# Patient Record
Sex: FEMALE | NOT HISPANIC OR LATINO | URBAN - METROPOLITAN AREA
[De-identification: names, ages, dates, MRNs, and addresses within clinical notes are randomized per-mention and may not be internally consistent; named-entity substitution may affect disease eponyms.]

---

## 2017-07-31 ENCOUNTER — CONVERSION ENCOUNTER (OUTPATIENT)
Dept: OTHER | Facility: HOSPITAL | Age: 72
End: 2017-07-31

## 2018-02-01 ENCOUNTER — OFFICE VISIT CONVERTED (OUTPATIENT)
Dept: FAMILY MEDICINE CLINIC | Age: 73
End: 2018-02-01
Attending: FAMILY MEDICINE

## 2018-03-29 ENCOUNTER — OFFICE VISIT CONVERTED (OUTPATIENT)
Dept: FAMILY MEDICINE CLINIC | Age: 73
End: 2018-03-29
Attending: FAMILY MEDICINE

## 2018-04-24 ENCOUNTER — OFFICE VISIT CONVERTED (OUTPATIENT)
Dept: FAMILY MEDICINE CLINIC | Age: 73
End: 2018-04-24
Attending: FAMILY MEDICINE

## 2021-05-18 NOTE — PROGRESS NOTES
Mirlande Bowie 1945     Office/Outpatient Visit    Visit Date: Thu, Mar 29, 2018 09:55 am    Provider: Sumeet Purdy MD (Assistant: Cheryl Corcoran MA)    Location: [facility]        Electronically signed by Sumeet Purdy MD on  03/29/2018 05:46:09 PM                             SUBJECTIVE:        CC: headache     Ms. Bowie is a 72 year old White female.  follow up from hospital visit last night;         HPI:     Heaven is in today for follow up on how she has been feeling.  She says that she had headache and some neck pain yesterday that bothered her.  She actually went to the ER last night and was treated and released.  She did have labs and CT that were apparently normal.  She is unsure what caused these symptoms.  She is under unusual stress in that she is moving in the next few weeks.  She also has to care for her  regularly.  At any rate, she had a numb feeling on the L side of her face.  She has had this previously.  She did have carotid ultrasound last year that was normal.          Heaven is also in for follow up on chronic pain related to degenerative disk disease in the lumbar spine.  She takes hydrocodone regularly for this and has been on it for a long period of time.  Her pain has been fairly stable since her most recent visit.  She denies abuse, diversion, or doctor shopping.  Leonid is okay.         She also has chronic anxiety for which she takes lorazepam.  She does take this mostly at night to help her rest and sees benefit from it.   She is without acute concerns.  She has not had obvious side effects from the medication.  As noted above, her stressors have been fairly significant.     ROS:     CONSTITUTIONAL:  Negative for chills and fever.      CARDIOVASCULAR:  Negative for chest pain and palpitations.      RESPIRATORY:  Negative for recent cough and dyspnea.      GASTROINTESTINAL:  Negative for abdominal pain, nausea and vomiting.          PMH/FMH/SH:     Last  Reviewed on 3/29/2018 10:21 AM by Sumeet Purdy    Past Medical History:         Hyperparathyroidism     Depression: treated with Lexapro in the past;     Suicidal ideation in past     Macular Degeneration    Retinal Histoplasmosis     Systemic Lupus Erythematosus         PREVENTIVE HEALTH MAINTENANCE             COLORECTAL CANCER SCREENING: Up to date (colonoscopy q10y; sigmoidoscopy q5y; Cologuard q3y) was last done 2011, Results are in chart; colonoscopy with normal results     MAMMOGRAM: Done within last 2 years and results in are chart was last done 2017 with normal results         Surgical History:         Cholecystectomy    Hysterectomy Procedures: colonoscopy          Family History:     Father: Medical history unknown     Mother:  at age 89; Cause of death was pneumonia;  Myocardial Infarction;  Alzheimer's Disease     Brother(s): Myocardial Infarction ( 47 )         Social History:     Occupation: (Self-Employed)      Marital Status:      Children: 2 children         Tobacco/Alcohol/Supplements:     Last Reviewed on 3/29/2018 10:21 AM by Sumeet Purdy    Tobacco: She has a past history of cigarette smoking; quit date:  .  Non-drinker         Substance Abuse History:     Last Reviewed on 3/29/2018 10:21 AM by Sumeet Purdy        Mental Health History:     Last Reviewed on 3/29/2018 10:21 AM by Sumeet Purdy        Communicable Diseases (eg STDs):     Last Reviewed on 3/29/2018 10:21 AM by Sumeet Purdy            Current Problems:     Last Reviewed on 3/29/2018 10:21 AM by Sumeet Purdy    Primary hyperparathyroidism     Dizziness     Migraine without aura     Use of high risk medications     Vitamin D deficiency     Hypertension     Anxiety     Hypercalcemia     Low back pain     Headache     Upper back pain         Immunizations:     zzPrevnar-13 2015     Fluzone (3 + years dose) 2011     Fluzone (3 +  years dose) 10/13/2012     Fluzone High-Dose pf (>=65 yr) 10/14/2013     Fluzone High-Dose pf (>=65 yr) 10/13/2014     Fluzone High-Dose pf (>=65 yr) 9/16/2015     Fluzone High-Dose pf (>=65 yr) 9/14/2016     PNEUMOVAX 23 (Pneumococcal PPV23) 1/15/2013     Adacel (Tdap) 1/15/2013         Allergies:     Last Reviewed on 3/29/2018 10:21 AM by Sumeet Purdy    Cipro:    Lexapro: Suicidal ideation        Current Medications:     Last Reviewed on 3/29/2018 10:21 AM by Sumeet Purdy    Norco 5mg/325mg Tablet Take 1 tablet(s) by mouth q8h prn     Amlodipine  2.5mg Tablet Take 1 tablet(s) by mouth daily     Ativan 0.5mg Tablet Take 1 tablet(s) by mouth bid prn     Corgard 40mg Tablet Take 1 tablet(s) by mouth daily     Trazodone HCl 50mg Tablet Take 1 tablet(s) by mouth at bedtime     Sumatriptan 50mg Tablet 1 tab(s) po prn  for migraine, may repeat dose every 2 hr, max 200mg/day     Aspirin (ASA) 81mg Tablets, Enteric Coated 1 tab daily         OBJECTIVE:        Vitals:         Current: 3/29/2018 10:00:09 AM    Ht:  5 ft, 4 in;  Wt: 133.8 lbs;  BMI: 23.0    T: 97.6 F (oral);  BP: 135/87 mm Hg (left arm, sitting);  P: 60 bpm (left arm (BP Cuff), sitting);  sCr: 1.03 mg/dL;  GFR: 48.30        Exams:     PHYSICAL EXAM:     GENERAL: vital signs recorded - well developed, well nourished;  no apparent distress;     EYES: extraocular movements intact; conjunctiva and cornea are normal; PERRLA;     E/N/T:  normal EACs, TMs, nasal/oral mucosa, teeth, gingiva, and oropharynx;     NECK: range of motion is normal; thyroid is non-palpable;     RESPIRATORY: normal respiratory rate and pattern with no distress; normal breath sounds with no rales, rhonchi, wheezes or rubs;     CARDIOVASCULAR: normal rate; rhythm is regular;  no systolic murmur; no edema;     GASTROINTESTINAL: nontender; normal bowel sounds; no organomegaly;     LYMPHATIC: no enlargement of cervical or facial nodes; no supraclavicular nodes;      BREAST/INTEGUMENT: SKIN: no significant rashes or lesions; no suspicious moles;     NEUROLOGIC: mental status: alert and oriented x 3; cranial nerves II-XII grossly intact; no focal weakness is apparent on exam;     PSYCHIATRIC: affect/demeanor: anxious;         ASSESSMENT           784.0   R51  Headache              DDx:     V58.69   Z79.899  Use of high risk medications              DDx:     300.02   F41.3  Anxiety              DDx:     724.2   M54.5  Low back pain              DDx:     782.0   R20.2  Paresthesia              DDx:     790.6   R73.01  Hyperglycemia              DDx:         ORDERS:         Lab Orders:       [id number]  A1CEG - [facility] Hemoglobin A1C  (Send-Out)         92034  B12FO - [facility] Vitamin B12 with Folate  (Send-Out)         44840  MG - [facility] Magnesium, Serum  (Send-Out)         88827  SED - [facility] Sedimentation rate, non-automated ESR  (Send-Out)                   PLAN:          Headache     LABORATORY:  Labs ordered to be performed today include ESR.      RECOMMENDATIONS given include: We have reviewed Heaven's care.  My feeling is that some of what she has been experiencing this week may be related to anxiety.  However, I want to be careful in this.  We will await the lab results from the hospital.  The CT and CXR are reassuring.  I may consider some additional labs to rule out PMR, but she is not in significant pain today.  With regard to her pain and anxiety, I am not planning to make changes in her medications presently.  They will be refilled when needed.  If these symptoms persist, and we think it is related to anxiety, consider a more regular medication for this..  She was on Lexapro previously and says that she developed suicidal thoughts while on it.         ADDENDUM - I've reviewed the testing from [facility].  Please see if their lab can add the labs in this visit.  If not, then I would recommend Heaven stop in tomorrow or next week for the labs noted here.  Thanks. - Sumeet Purdy MD - 3/29/18 - 12:37  3/29/18 order faxed to [facility]           Orders:       [id number]  SED - [facility] Sedimentation rate, non-automated ESR  (Send-Out)             Patient Education Handouts:       [facility] [place]           Use of high risk medications As above.          Anxiety As above.          Low back pain As above.          Paresthesia     LABORATORY:  Labs ordered to be performed today include B12 with Folate and Magnesium level.            Orders:       08353  B12FO - [facility] Vitamin B12 with Folate  (Send-Out)         43221  MG - [facility] Magnesium, Serum  (Send-Out)            Hyperglycemia     LABORATORY:  Labs ordered to be performed today include HgbA1C.            Orders:       03488  A1CEG - [facility] Hemoglobin A1C  (Send-Out)               CHARGE CAPTURE           **Please note: ICD descriptions below are intended for billing purposes only and may not represent clinical diagnoses**        Primary Diagnosis:         784.0 Headache            R51    Headache              Orders:          [id number]   Office/outpatient visit; established patient, level 4  (In-House)           V58.69 Use of high risk medications            Z79.899    Other long term (current) drug therapy    300.02 Anxiety            F41.3    Other mixed anxiety disorders    724.2 Low back pain            M54.5    Low back pain    782.0 Paresthesia            R20.2    Paresthesia of skin    790.6 Hyperglycemia            R73.01    Impaired fasting glucose        ADDENDUMS:      ____________________________________    Addendum: 03/31/2018 08:45 AM - Sumeet Purdy        Please let Heaven know that the labs we added to what was done in the ER look okay.  However, her B12 level is borderline.  I have sent a supplement for this to her pharmacy.  I'd like her to monitor her symptoms for the time being.  We may consider additional evaluation depending on how things progress.  Let me know if she has particular concerns.  Thanks.        Addendum: 04/02/2018 01:41 PM - Four,  Team        pt inf/th

## 2021-05-18 NOTE — PROGRESS NOTES
Mirlande Bowie 1945     Office/Outpatient Visit    Visit Date: Thu, Feb 1, 2018 10:59 am    Provider: Sumeet Purdy MD (Assistant: Hawa Coughlin MA)    Location: [facility]        Electronically signed by Sumeet Purdy MD on  02/01/2018 01:19:13 PM                             SUBJECTIVE:        CC: medication follow up, pain, anxiety, blood pressure     Ms. Bowie is a 72 year old White female.  (PT not taking buspirone);         HPI:     Heaven is in today for follow up on chronic pain related to degenerative disk disease in the lumbar spine.  She takes hydrocodone regularly for this and has been on it for a long period of time.  She says that her pain has gotten worse in the last couple of months.  She says that it is worse with activity.  She does not feel that we could make a change in the medications.  She would like to move ahead with MRI possibly.  The pain is primarily located in the mid to upper back at this time.  She denies abuse, diversion, or doctor shopping.  Leonid is okay.         She also has chronic anxiety for which she has been on lorazepam chronically.  She does take this mostly at night to help her rest and sees benefit from it.   She is without acute concerns.  She has not had obvious side effects from the medication.  She helps care for her  and continues to have significant stress related to this.  She does not feel that we can cut back on this presently.  Risks of these medications are discussed.         Dx with hypertension; her current cardiac medication regimen includes a beta-blocker ( Corgard ) and a calcium channel blocker ( Norvasc ).  She is tolerating the medication well without side effects.  Compliance with treatment has been good; she takes her medication as directed.          She also has primary hyperparathyroidism and is having periodic blood work.      ROS:     CONSTITUTIONAL:  Negative for chills and fever.      CARDIOVASCULAR:  Negative  for chest pain and palpitations.      RESPIRATORY:  Negative for recent cough and dyspnea.      GASTROINTESTINAL:  Negative for abdominal pain, nausea and vomiting.      INTEGUMENTARY/BREAST:  Negative for atypical mole(s) and rash.          PMH/FMH/SH:     Last Reviewed on 2018 11:36 AM by Sumeet Purdy    Past Medical History:         Hyperparathyroidism     Depression: treated with Lexapro in the past;     Suicidal ideation in past     Macular Degeneration    Retinal Histoplasmosis     Systemic Lupus Erythematosus         PREVENTIVE HEALTH MAINTENANCE             COLORECTAL CANCER SCREENING: Up to date (colonoscopy q10y; sigmoidoscopy q5y; Cologuard q3y) was last done 2011, Results are in chart; colonoscopy with normal results     MAMMOGRAM: Done within last 2 years and results in are chart was last done 2017 with normal results         Surgical History:         Cholecystectomy    Hysterectomy Procedures: colonoscopy          Family History:     Father: Medical history unknown     Mother:  at age 89; Cause of death was pneumonia;  Myocardial Infarction;  Alzheimer's Disease     Brother(s): Myocardial Infarction ( 47 )         Social History:     Occupation: (Self-Employed)      Marital Status:      Children: 2 children         Tobacco/Alcohol/Supplements:     Last Reviewed on 2018 11:36 AM by Sumeet Purdy    Tobacco: She has a past history of cigarette smoking; quit date:  .  Non-drinker         Substance Abuse History:     Last Reviewed on 2018 11:36 AM by Sumeet Purdy        Mental Health History:     Last Reviewed on 2018 11:36 AM by Sumeet Purdy        Communicable Diseases (eg STDs):     Last Reviewed on 2018 11:36 AM by Sumeet Purdy            Current Problems:     Last Reviewed on 2018 11:36 AM by Sumeet Purdy    Primary hyperparathyroidism     Dizziness     Migraine without aura      Use of high risk medications     Vitamin D deficiency     Hypertension     Anxiety     Hypercalcemia     Low back pain         Immunizations:     Prevnar-13 12/29/2015     Fluzone (3 + years dose) 11/8/2011     Fluzone (3 + years dose) 10/13/2012     Fluzone High-Dose pf (>=65 yr) 10/14/2013     Fluzone High-Dose pf (>=65 yr) 10/13/2014     Fluzone High-Dose pf (>=65 yr) 9/16/2015     Fluzone High-Dose pf (>=65 yr) 9/14/2016     PNEUMOVAX 23 (Pneumococcal PPV23) 1/15/2013     Adacel (Tdap) 1/15/2013         Allergies:     Last Reviewed on 2/01/2018 11:36 AM by Sumeet Purdy    Cipro:        Current Medications:     Last Reviewed on 2/01/2018 11:36 AM by Sumeet Purdy    Corgard 40mg Tablet Take 1 tablet(s) by mouth daily     Norco 5mg/325mg Tablet Take 1 tablet(s) by mouth q8h prn     Amlodipine  2.5mg Tablet Take 1 tablet(s) by mouth daily     Ativan 0.5mg Tablet Take 1 tablet(s) by mouth bid prn     Trazodone HCl 50mg Tablet Take 1 tablet(s) by mouth at bedtime     Sumatriptan 50mg Tablet 1 tab(s) po prn  for migraine, may repeat dose every 2 hr, max 200mg/day     Aspirin (ASA) 81mg Tablets, Enteric Coated 1 tab daily         OBJECTIVE:        Vitals:         Current: 2/1/2018 11:03:34 AM    Ht:  5 ft, 4 in;  Wt: 139.5 lbs;  BMI: 23.9    T: 98.5 F (oral);  BP: 137/89 mm Hg (left arm, sitting);  P: 66 bpm (left arm (BP Cuff), sitting);  sCr: 1.03 mg/dL;  GFR: 49.17        Exams:     PHYSICAL EXAM:     GENERAL: vital signs recorded - well developed, well nourished;  no apparent distress;     EYES: extraocular movements intact; conjunctiva and cornea are normal; PERRLA;     E/N/T:  normal EACs, TMs, nasal/oral mucosa, teeth, gingiva, and oropharynx;     NECK: range of motion is normal; thyroid is non-palpable;     RESPIRATORY: normal respiratory rate and pattern with no distress; normal breath sounds with no rales, rhonchi, wheezes or rubs;     CARDIOVASCULAR: normal rate; rhythm is regular;  no  systolic murmur; no edema;     GASTROINTESTINAL: nontender; normal bowel sounds; no organomegaly;     BREAST/INTEGUMENT: SKIN: no significant rashes or lesions; no suspicious moles;     MUSCULOSKELETAL: there is no focal tenderness in the lower back - some discomfort in palpation in the mid back though - no other acute finding;     NEUROLOGIC: no obvious weakness in the lower legs;         ASSESSMENT           V58.69   Z79.899  Use of high risk medications              DDx:     300.02   F41.3  Anxiety              DDx:     724.2   M54.5  Low back pain              DDx:     401.1   I10  Hypertension              DDx:     252.01   E21.0  Primary hyperparathyroidism              DDx:     724.1   M54.6  Upper back pain              DDx:         ORDERS:         Meds Prescribed:       Refill of: Corgard (Nadolol) 40mg Tablet Take 1 tablet(s) by mouth daily  #90 (Ninety) tablet(s) Refills: 3       Refill of: Norco (Hydrocodone/Acetaminophen) 5mg/325mg Tablet Take 1 tablet(s) by mouth q8h prn  #90 (Ninety) tablet(s) Refills: 0       Refill of: Amlodipine  2.5mg Tablet Take 1 tablet(s) by mouth daily  #90 (Ninety) tablet(s) Refills: 3       Refill of: Ativan (Lorazepam) 0.5mg Tablet Take 1 tablet(s) by mouth bid prn  #60 (Sixty) tablet(s) Refills: 2       Refill of: Trazodone HCl 50mg Tablet Take 1 tablet(s) by mouth at bedtime  #90 (Ninety) tablet(s) Refills: 3       Hydrocortisone/Neomycin/Polymyxin B  1%/0.35%/10,000U Otic Suspension Instill 4 drop(s) in affected ear(s) tid  #10 (Ten) ml Refills: 0         Radiology/Test Orders:       [id number]  Radiologic examination, spine; thoracic, three views  (Send-Out)           Lab Orders:       [id number]  COMP - [facility] Comp. Metabolic Panel  (Send-Out)         [id number]  PTHIN - [facility] PTH, intact  (Send-Out)         27227  VITD - [facility] Vitamin D, 25 Hydroxy  (Send-Out)                   PLAN:          Use of high risk medications         RECOMMENDATIONS given include: Today, we have reviewed  Mirlande's care.  She has been doing well, but she is seeing some increased pain in the mid and upper back.  We will go ahead and get X-rays as a caution on this.  I don't think MRI needed yet.  We will refill her medications as noted including hydrocodone and lorazepam.  Risks are discussed as noted, but they are helpful medications for her, and she is not having signs of adverse effects from them.  We will monitor closely.  Labs to be arranged in about 30 days..            Patient Education Handouts:       Controlled Prescription Drug education           Anxiety As above.           Prescriptions:       Refill of: Ativan (Lorazepam) 0.5mg Tablet Take 1 tablet(s) by mouth bid prn  #60 (Sixty) tablet(s) Refills: 2       Refill of: Trazodone HCl 50mg Tablet Take 1 tablet(s) by mouth at bedtime  #90 (Ninety) tablet(s) Refills: 3          Low back pain As above.           Prescriptions:       Refill of: Norco (Hydrocodone/Acetaminophen) 5mg/325mg Tablet Take 1 tablet(s) by mouth q8h prn  #90 (Ninety) tablet(s) Refills: 0          Hypertension As above.     LABORATORY:  Labs ordered to be performed today include Comprehensive metabolic panel.            Prescriptions:       Refill of: Corgard (Nadolol) 40mg Tablet Take 1 tablet(s) by mouth daily  #90 (Ninety) tablet(s) Refills: 3       Refill of: Amlodipine  2.5mg Tablet Take 1 tablet(s) by mouth daily  #90 (Ninety) tablet(s) Refills: 3           Orders:       [id number]  COMP - [facility] Comp. Metabolic Panel  (Send-Out)            Primary hyperparathyroidism As above.     LABORATORY:  Labs ordered to be performed today include PTH intact and Vitamin D.            Orders:       [id number]  PTHIN - [facility] PTH, intact  (Send-Out)         63846  VITD - [facility] Vitamin D, 25 Hydroxy  (Send-Out)            Upper back pain         RADIOLOGY:  I have ordered Thoracic Spine to be done today.            Orders:       [id number]  Radiologic examination, spine; thoracic, three views  (Send-Out)                Other Prescriptions:       Hydrocortisone/Neomycin/Polymyxin B  1%/0.35%/10,000U Otic Suspension Instill 4 drop(s) in affected ear(s) tid  #10 (Ten) ml Refills: 0         CHARGE CAPTURE           **Please note: ICD descriptions below are intended for billing purposes only and may not represent clinical diagnoses**        Primary Diagnosis:         V58.69 Use of high risk medications            Z79.899    Other long term (current) drug therapy              Orders:          [id number]   Office/outpatient visit; established patient, level 4  (In-House)           300.02 Anxiety            F41.3    Other mixed anxiety disorders    724.2 Low back pain            M54.5    Low back pain    401.1 Hypertension            I10    Essential (primary) hypertension    252.01 Primary hyperparathyroidism            E21.0    Primary hyperparathyroidism    724.1 Upper back pain            M54.6    Pain in thoracic spine        ADDENDUMS:      ____________________________________    Addendum: 02/01/2018 11:57 AM -          Radiology Orders Faxed to:

## 2021-05-18 NOTE — PROGRESS NOTES
Mirlande BowiePenelope 1945     Office/Outpatient Visit    Visit Date: Tue, Apr 24, 2018 10:07 am    Provider: Sumeet Purdy MD (Assistant: Cynthia Sewell MA)    Location: [facility]        Electronically signed by Sumeet Purdy MD on  04/24/2018 05:27:43 PM                             SUBJECTIVE:        CC: pain, anxiety         HPI:     Heaven is in today for follow up on chronic pain related to degenerative disk disease in the lumbar spine.  She was just seen last month, but she is preparing to move and wanted to be seen to cover things for the next 90 days.  Heaven takes hydrocodone regularly for her back and has been on it for a long period of time.  Her pain has been fairly stable since her most recent visit.  She denies abuse, diversion, or doctor shopping.  Leonid is okay.  She is due for drug screen.         She also has chronic anxiety for which she takes lorazepam.  She takes this mostly at night to help her rest and sees benefit from it.   She is without acute concerns.  She has not had obvious side effects from the medication.  As noted, she will be moving, and her stressors are about the same.  I do not see an opportunity for dose reduction.     ROS:     CONSTITUTIONAL:  Negative for chills and fever.      CARDIOVASCULAR:  Negative for chest pain and palpitations.      RESPIRATORY:  Negative for recent cough and dyspnea.      GASTROINTESTINAL:  Negative for abdominal pain, nausea and vomiting.          PMH/FMH/SH:     Last Reviewed on 4/24/2018 10:22 AM by Sumeet Purdy    Past Medical History:         Hyperparathyroidism     Depression: treated with Lexapro in the past;     Suicidal ideation in past     Macular Degeneration    Retinal Histoplasmosis     Systemic Lupus Erythematosus         PREVENTIVE HEALTH MAINTENANCE             COLORECTAL CANCER SCREENING: Up to date (colonoscopy q10y; sigmoidoscopy q5y; Cologuard q3y) was last done 05/2011, Results are in chart; colonoscopy  with normal results     MAMMOGRAM: Done within last 2 years and results in are chart was last done 2017 with normal results         Surgical History:         Cholecystectomy    Hysterectomy Procedures: colonoscopy          Family History:     Father: Medical history unknown     Mother:  at age 89; Cause of death was pneumonia;  Myocardial Infarction;  Alzheimer's Disease     Brother(s): Myocardial Infarction ( 47 )         Social History:     Occupation: (Self-Employed)      Marital Status:      Children: 2 children         Tobacco/Alcohol/Supplements:     Last Reviewed on 2018 10:22 AM by Sumeet Purdy    Tobacco: She has a past history of cigarette smoking; quit date:  .  Non-drinker         Substance Abuse History:     Last Reviewed on 2018 10:22 AM by Sumeet Purdy        Mental Health History:     Last Reviewed on 2018 10:22 AM by Sumeet Purdy        Communicable Diseases (eg STDs):     Last Reviewed on 2018 10:22 AM by Sumeet Purdy            Current Problems:     Last Reviewed on 2018 10:22 AM by Sumeet Purdy    Primary hyperparathyroidism     Dizziness     Migraine without aura     Use of high risk medications     Vitamin D deficiency     Hypertension     Anxiety     Hypercalcemia     Low back pain     Hyperglycemia     Paresthesia     Headache     Upper back pain         Immunizations:     zzPrevnar-13 2015     Fluzone (3 + years dose) 2011     Fluzone (3 + years dose) 10/13/2012     Fluzone High-Dose pf (>=65 yr) 10/14/2013     Fluzone High-Dose pf (>=65 yr) 10/13/2014     Fluzone High-Dose pf (>=65 yr) 2015     Fluzone High-Dose pf (>=65 yr) 2016     PNEUMOVAX 23 (Pneumococcal PPV23) 1/15/2013     Adacel (Tdap) 1/15/2013         Allergies:     Last Reviewed on 2018 10:22 AM by Sumeet Purdy    Lexapro: Suicidal ideation    Cipro:        Current Medications:      Last Reviewed on 4/24/2018 10:22 AM by Sumeet Purdy 5mg/325mg Tablet Take 1 tablet(s) by mouth q8h prn     Vitamin B12 250mcg Tablet Take 1 tablet(s) by mouth daily     Amlodipine  2.5mg Tablet Take 1 tablet(s) by mouth daily     Ativan 0.5mg Tablet Take 1 tablet(s) by mouth bid prn     Corgard 40mg Tablet Take 1 tablet(s) by mouth daily     Trazodone HCl 50mg Tablet Take 1 tablet(s) by mouth at bedtime     Sumatriptan 50mg Tablet 1 tab(s) po prn  for migraine, may repeat dose every 2 hr, max 200mg/day     Aspirin (ASA) 81mg Tablets, Enteric Coated 1 tab daily     Vitamin D3         OBJECTIVE:        Vitals:         Current: 4/24/2018 10:10:24 AM    Ht:  5 ft, 4 in;  Wt: 134.8 lbs;  BMI: 23.1    T: 98.1 F (oral);  BP: 131/87 mm Hg (right arm, sitting);  P: 64 bpm (right arm (BP Cuff), sitting);  sCr: 1.03 mg/dL;  GFR: 48.46        Exams:     PHYSICAL EXAM:     GENERAL: vital signs recorded - well developed, well nourished;  no apparent distress;     NECK: range of motion is normal; thyroid is non-palpable;     RESPIRATORY: normal respiratory rate and pattern with no distress; normal breath sounds with no rales, rhonchi, wheezes or rubs;     CARDIOVASCULAR: normal rate; rhythm is regular;  no systolic murmur; no edema;     GASTROINTESTINAL: nontender; normal bowel sounds; no organomegaly;     BREAST/INTEGUMENT: SKIN: no significant rashes or lesions; no suspicious moles;     MUSCULOSKELETAL: there is no concerning finding in the lower back;     PSYCHIATRIC: appropriate affect and demeanor; normal psychomotor function;         Lab/Test Results:             Amphetamines Screen, Urin:  Negative (04/24/2018),     BAR-Barbiturates Screen, Urin:  Negative (04/24/2018),     Buprenorphine:  Negative (04/24/2018),     BZO-Benzodiazepines Screen,Ur:  Positive (04/24/2018),     Cocaine(Metab.)Screen, Ur:  Negative (04/24/2018),     MDMA-Ecstasy:  Negative (04/24/2018),     Met-Methamphetamine:  Negative  (04/24/2018),     MTD-Methadone Screen, Urine:  Negative (04/24/2018),     Opiate Screen, Urine:  Positive (04/24/2018),     OXY-Oxycodone:  Positive (04/24/2018),     PCP-Phencyclidine Screen, Uri:  Negative (04/24/2018),     THC Cannabinoids Screen, Urin:  Negative (04/24/2018),     Urine temperature:  confirmed (04/24/2018),     Date and time of last pill:  Ativan 4/23/18@10pm [place] 4/24/18@7am (04/24/2018),     Performed by:  tls (04/24/2018),     Collection Time:  10:30 (04/24/2018),             ASSESSMENT           V58.69   Z79.899  Use of high risk medications              DDx:     300.02   F41.3  Anxiety              DDx:     724.2   M54.5  Low back pain              DDx:         ORDERS:         Lab Orders:       [id number]  Drug test prsmv qual dir optical obs per day  (In-House)         [id number]  [facility] [id number] OPIATES  (Send-Out)                   PLAN:          Use of high risk medications     LABORATORY:  Labs ordered to be performed today include Drug screen and Drug Screen Urine [facility] Confirmation OPIATES.      RECOMMENDATIONS given include: Today, we have reviewed Heaven's care.  She is doing well at this time.  I'm going to continue her current medications, and she will contact us when she needs refills.  She is moving to [facility].  We will refills these for her there when they are due.  I did ask her to consider seeing a physician there at some point.  There have not been concerns about abuse or diversion with Heaven.  We will follow closely..            Orders:       [id number]  Drug test prsmv qual dir optical obs per day  (In-House)         [id number]  [facility] - [facility] [id number] OPIATES  (Send-Out)             Patient Education Handouts:       [facility] Medication Compliance           Anxiety As above.             CHARGE CAPTURE           **Please note: ICD descriptions below are intended for billing purposes only and may not represent clinical diagnoses**        Primary Diagnosis:         V58.69 Use of high risk  medications            Z79.899    Other long term (current) drug therapy              Orders:          35697   Office/outpatient visit; established patient, level 3  (In-House)             99079   Drug test prsmv qual dir optical obs per day  (In-House)           300.02 Anxiety            F41.3    Other mixed anxiety disorders    724.2 Low back pain            M54.5    Low back pain

## 2021-07-01 VITALS
BODY MASS INDEX: 23.01 KG/M2 | HEART RATE: 64 BPM | HEIGHT: 64 IN | SYSTOLIC BLOOD PRESSURE: 131 MMHG | TEMPERATURE: 98.1 F | WEIGHT: 134.8 LBS | DIASTOLIC BLOOD PRESSURE: 87 MMHG

## 2021-07-01 VITALS
WEIGHT: 133.8 LBS | DIASTOLIC BLOOD PRESSURE: 87 MMHG | BODY MASS INDEX: 22.84 KG/M2 | HEART RATE: 60 BPM | SYSTOLIC BLOOD PRESSURE: 135 MMHG | TEMPERATURE: 97.6 F | HEIGHT: 64 IN

## 2021-07-01 VITALS
WEIGHT: 139.5 LBS | BODY MASS INDEX: 23.82 KG/M2 | SYSTOLIC BLOOD PRESSURE: 137 MMHG | DIASTOLIC BLOOD PRESSURE: 89 MMHG | HEIGHT: 64 IN | TEMPERATURE: 98.5 F | HEART RATE: 66 BPM